# Patient Record
Sex: MALE
[De-identification: names, ages, dates, MRNs, and addresses within clinical notes are randomized per-mention and may not be internally consistent; named-entity substitution may affect disease eponyms.]

---

## 2023-06-28 PROBLEM — Z00.00 ENCOUNTER FOR PREVENTIVE HEALTH EXAMINATION: Status: ACTIVE | Noted: 2023-06-28

## 2023-07-20 ENCOUNTER — APPOINTMENT (OUTPATIENT)
Dept: ORTHOPEDIC SURGERY | Facility: CLINIC | Age: 23
End: 2023-07-20
Payer: COMMERCIAL

## 2023-07-20 VITALS
OXYGEN SATURATION: 95 % | WEIGHT: 215 LBS | HEIGHT: 74 IN | DIASTOLIC BLOOD PRESSURE: 70 MMHG | SYSTOLIC BLOOD PRESSURE: 117 MMHG | HEART RATE: 81 BPM | BODY MASS INDEX: 27.59 KG/M2

## 2023-07-20 DIAGNOSIS — Z87.39 PERSONAL HISTORY OF OTHER DISEASES OF THE MUSCULOSKELETAL SYSTEM AND CONNECTIVE TISSUE: ICD-10-CM

## 2023-07-20 DIAGNOSIS — Z78.9 OTHER SPECIFIED HEALTH STATUS: ICD-10-CM

## 2023-07-20 PROCEDURE — 72110 X-RAY EXAM L-2 SPINE 4/>VWS: CPT

## 2023-07-20 PROCEDURE — 99204 OFFICE O/P NEW MOD 45 MIN: CPT
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
## 2023-07-25 NOTE — DISCUSSION/SUMMARY
[de-identified] : A lengthy discussion was had with the patient regarding his condition.  Rx MRI lumbar spine to be done promptly, as concerning for LLE weakness (tib ant/EHL).    Will call pt with results and plan.  \par The patient's questions were sought and answered satisfactorily.\par \par \par \par Sarah MOLINA, NP am acting as a scribe for Dr. Frank Schwab.\par \par  no concerns

## 2023-07-25 NOTE — PHYSICAL EXAM
[de-identified] : NVI, except L tib ant/EHL 4/5.  [de-identified] : Lumbar ap/lat/flex/ext xrays 7/20/2023:  no osseous abnormality, mild lumbar scoliosis

## 2023-08-01 PROBLEM — Z87.39 H/O LOW BACK PAIN: Status: ACTIVE | Noted: 2023-07-19

## 2024-01-12 ENCOUNTER — APPOINTMENT (OUTPATIENT)
Dept: ORTHOPEDIC SURGERY | Facility: CLINIC | Age: 24
End: 2024-01-12

## 2024-01-12 ENCOUNTER — APPOINTMENT (OUTPATIENT)
Dept: ORTHOPEDIC SURGERY | Facility: CLINIC | Age: 24
End: 2024-01-12
Payer: COMMERCIAL

## 2024-01-12 DIAGNOSIS — M51.27 OTHER INTERVERTEBRAL DISC DISPLACEMENT, LUMBOSACRAL REGION: ICD-10-CM

## 2024-01-12 PROCEDURE — 99212 OFFICE O/P EST SF 10 MIN: CPT

## 2024-01-17 ENCOUNTER — APPOINTMENT (OUTPATIENT)
Dept: ORTHOPEDIC SURGERY | Facility: CLINIC | Age: 24
End: 2024-01-17

## 2024-01-30 PROBLEM — M51.27 HERNIATED NUCLEUS PULPOSUS OF LUMBOSACRAL REGION: Status: ACTIVE | Noted: 2023-07-20

## 2024-01-30 NOTE — DISCUSSION/SUMMARY
[de-identified] : A lengthy discussion was held with the patient regarding his condition.  We discussed nonoperative treatment strategies including physical therapy, pharmacologic management and steroid injections.  We discussed surgical options (MIS decompression- referral to Dr Mcdowell) and the attendant benefits, alternatives, and risks, including but not limited to infection, bleeding, persistent pain, persistent neurologic deficit, neurologic injury, adjacent segment degeneration, and the need for future surgery.  The patient's questions were sought and answered satisfactorily. He will try a second BALJIT.  F/u 2-3 weeks after inj.  I, Sarah Roberson NP am acting as a scribe for Dr. Frank Schwab.
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
## 2024-01-30 NOTE — PHYSICAL EXAM
[de-identified] : tele [de-identified] : MRI L spine 1/3/24 CD (smp):  HNP L4-5 with annular tear, small HNP L3-4 with annular tear